# Patient Record
Sex: FEMALE | Race: BLACK OR AFRICAN AMERICAN | NOT HISPANIC OR LATINO | Employment: OTHER | URBAN - METROPOLITAN AREA
[De-identification: names, ages, dates, MRNs, and addresses within clinical notes are randomized per-mention and may not be internally consistent; named-entity substitution may affect disease eponyms.]

---

## 2023-05-28 ENCOUNTER — HOSPITAL ENCOUNTER (EMERGENCY)
Facility: HOSPITAL | Age: 58
Discharge: HOME | End: 2023-05-28
Attending: STUDENT IN AN ORGANIZED HEALTH CARE EDUCATION/TRAINING PROGRAM
Payer: OTHER GOVERNMENT

## 2023-05-28 VITALS
HEART RATE: 76 BPM | OXYGEN SATURATION: 100 % | RESPIRATION RATE: 17 BRPM | TEMPERATURE: 98 F | SYSTOLIC BLOOD PRESSURE: 142 MMHG | DIASTOLIC BLOOD PRESSURE: 97 MMHG

## 2023-05-28 DIAGNOSIS — K57.92 DIVERTICULITIS: ICD-10-CM

## 2023-05-28 DIAGNOSIS — R10.84 GENERALIZED ABDOMINAL PAIN: Primary | ICD-10-CM

## 2023-05-28 PROBLEM — K59.04 CHRONIC IDIOPATHIC CONSTIPATION: Status: ACTIVE | Noted: 2023-05-28

## 2023-05-28 PROBLEM — D50.9 IRON DEFICIENCY ANEMIA: Status: ACTIVE | Noted: 2023-05-28

## 2023-05-28 PROBLEM — I10 ESSENTIAL HYPERTENSION: Status: ACTIVE | Noted: 2023-05-28

## 2023-05-28 PROBLEM — E78.5 HYPERLIPIDEMIA: Status: ACTIVE | Noted: 2023-05-28

## 2023-05-28 PROBLEM — E11.9 DIABETES MELLITUS WITHOUT COMPLICATION: Status: ACTIVE | Noted: 2023-05-28

## 2023-05-28 PROBLEM — F32.A DEPRESSION: Status: ACTIVE | Noted: 2023-05-28

## 2023-05-28 LAB
ALBUMIN SERPL-MCNC: 4.1 G/DL (ref 3.5–5)
ALBUMIN/GLOB SERPL: 1 RATIO (ref 1.1–2)
ALP SERPL-CCNC: 119 UNIT/L (ref 40–150)
ALT SERPL-CCNC: 13 UNIT/L (ref 0–55)
APPEARANCE UR: CLEAR
AST SERPL-CCNC: 19 UNIT/L (ref 5–34)
BACTERIA #/AREA URNS AUTO: NORMAL /HPF
BASOPHILS # BLD AUTO: 0.02 X10(3)/MCL
BASOPHILS NFR BLD AUTO: 0.2 %
BILIRUB UR QL STRIP.AUTO: NEGATIVE MG/DL
BILIRUBIN DIRECT+TOT PNL SERPL-MCNC: 0.6 MG/DL
BUN SERPL-MCNC: 6.9 MG/DL (ref 9.8–20.1)
CALCIUM SERPL-MCNC: 9.8 MG/DL (ref 8.4–10.2)
CHLORIDE SERPL-SCNC: 111 MMOL/L (ref 98–107)
CO2 SERPL-SCNC: 21 MMOL/L (ref 22–29)
COLOR UR: YELLOW
CREAT SERPL-MCNC: 0.89 MG/DL (ref 0.55–1.02)
EOSINOPHIL # BLD AUTO: 0.04 X10(3)/MCL (ref 0–0.9)
EOSINOPHIL NFR BLD AUTO: 0.3 %
ERYTHROCYTE [DISTWIDTH] IN BLOOD BY AUTOMATED COUNT: 13.3 % (ref 11.5–17)
GFR SERPLBLD CREATININE-BSD FMLA CKD-EPI: >60 MLS/MIN/1.73/M2
GLOBULIN SER-MCNC: 4.2 GM/DL (ref 2.4–3.5)
GLUCOSE SERPL-MCNC: 99 MG/DL (ref 74–100)
GLUCOSE UR QL STRIP.AUTO: ABNORMAL MG/DL
HCT VFR BLD AUTO: 47.8 % (ref 37–47)
HGB BLD-MCNC: 15 G/DL (ref 12–16)
IMM GRANULOCYTES # BLD AUTO: 0.02 X10(3)/MCL (ref 0–0.04)
IMM GRANULOCYTES NFR BLD AUTO: 0.2 %
KETONES UR QL STRIP.AUTO: NEGATIVE MG/DL
LEUKOCYTE ESTERASE UR QL STRIP.AUTO: NEGATIVE UNIT/L
LIPASE SERPL-CCNC: 8 U/L
LYMPHOCYTES # BLD AUTO: 2.06 X10(3)/MCL (ref 0.6–4.6)
LYMPHOCYTES NFR BLD AUTO: 17.5 %
MCH RBC QN AUTO: 26.7 PG (ref 27–31)
MCHC RBC AUTO-ENTMCNC: 31.4 G/DL (ref 33–36)
MCV RBC AUTO: 85.1 FL (ref 80–94)
MONOCYTES # BLD AUTO: 0.83 X10(3)/MCL (ref 0.1–1.3)
MONOCYTES NFR BLD AUTO: 7.1 %
NEUTROPHILS # BLD AUTO: 8.77 X10(3)/MCL (ref 2.1–9.2)
NEUTROPHILS NFR BLD AUTO: 74.7 %
NITRITE UR QL STRIP.AUTO: NEGATIVE
NRBC BLD AUTO-RTO: 0 %
PH UR STRIP.AUTO: 6.5 [PH]
PLATELET # BLD AUTO: 419 X10(3)/MCL (ref 130–400)
PMV BLD AUTO: 9.2 FL (ref 7.4–10.4)
POTASSIUM SERPL-SCNC: 3.8 MMOL/L (ref 3.5–5.1)
PROT SERPL-MCNC: 8.3 GM/DL (ref 6.4–8.3)
PROT UR QL STRIP.AUTO: NEGATIVE MG/DL
RBC # BLD AUTO: 5.62 X10(6)/MCL (ref 4.2–5.4)
RBC #/AREA URNS AUTO: <5 /HPF
RBC UR QL AUTO: NEGATIVE UNIT/L
SODIUM SERPL-SCNC: 140 MMOL/L (ref 136–145)
SP GR UR STRIP.AUTO: 1.02 (ref 1–1.03)
SQUAMOUS #/AREA URNS AUTO: <5 /HPF
UROBILINOGEN UR STRIP-ACNC: 0.2 MG/DL
WBC # SPEC AUTO: 11.74 X10(3)/MCL (ref 4.5–11.5)
WBC #/AREA URNS AUTO: <5 /HPF

## 2023-05-28 PROCEDURE — 63600175 PHARM REV CODE 636 W HCPCS: Performed by: NURSE PRACTITIONER

## 2023-05-28 PROCEDURE — 96372 THER/PROPH/DIAG INJ SC/IM: CPT | Mod: 59 | Performed by: PHYSICIAN ASSISTANT

## 2023-05-28 PROCEDURE — 63600175 PHARM REV CODE 636 W HCPCS: Performed by: PHYSICIAN ASSISTANT

## 2023-05-28 PROCEDURE — 99285 EMERGENCY DEPT VISIT HI MDM: CPT | Mod: 25

## 2023-05-28 PROCEDURE — 85025 COMPLETE CBC W/AUTO DIFF WBC: CPT | Performed by: NURSE PRACTITIONER

## 2023-05-28 PROCEDURE — 25000003 PHARM REV CODE 250: Performed by: NURSE PRACTITIONER

## 2023-05-28 PROCEDURE — 96374 THER/PROPH/DIAG INJ IV PUSH: CPT | Mod: 59

## 2023-05-28 PROCEDURE — 96375 TX/PRO/DX INJ NEW DRUG ADDON: CPT

## 2023-05-28 PROCEDURE — 80053 COMPREHEN METABOLIC PANEL: CPT | Performed by: NURSE PRACTITIONER

## 2023-05-28 PROCEDURE — 83690 ASSAY OF LIPASE: CPT | Performed by: NURSE PRACTITIONER

## 2023-05-28 PROCEDURE — 25500020 PHARM REV CODE 255: Performed by: PHYSICIAN ASSISTANT

## 2023-05-28 PROCEDURE — 96361 HYDRATE IV INFUSION ADD-ON: CPT

## 2023-05-28 PROCEDURE — 81001 URINALYSIS AUTO W/SCOPE: CPT | Performed by: NURSE PRACTITIONER

## 2023-05-28 RX ORDER — DICYCLOMINE HYDROCHLORIDE 20 MG/1
20 TABLET ORAL 4 TIMES DAILY
Qty: 28 TABLET | Refills: 0 | Status: SHIPPED | OUTPATIENT
Start: 2023-05-28 | End: 2023-06-04

## 2023-05-28 RX ORDER — CIPROFLOXACIN 500 MG/1
500 TABLET ORAL 2 TIMES DAILY
Qty: 14 TABLET | Refills: 0 | Status: SHIPPED | OUTPATIENT
Start: 2023-05-28 | End: 2023-06-04

## 2023-05-28 RX ORDER — MORPHINE SULFATE 4 MG/ML
4 INJECTION, SOLUTION INTRAMUSCULAR; INTRAVENOUS
Status: COMPLETED | OUTPATIENT
Start: 2023-05-28 | End: 2023-05-28

## 2023-05-28 RX ORDER — PROMETHAZINE HYDROCHLORIDE 25 MG/ML
25 INJECTION, SOLUTION INTRAMUSCULAR; INTRAVENOUS
Status: COMPLETED | OUTPATIENT
Start: 2023-05-28 | End: 2023-05-28

## 2023-05-28 RX ORDER — ONDANSETRON 4 MG/1
4 TABLET, ORALLY DISINTEGRATING ORAL EVERY 6 HOURS PRN
Qty: 20 TABLET | Refills: 0 | Status: SHIPPED | OUTPATIENT
Start: 2023-05-28

## 2023-05-28 RX ORDER — METRONIDAZOLE 500 MG/1
500 TABLET ORAL 2 TIMES DAILY
Qty: 14 TABLET | Refills: 0 | Status: SHIPPED | OUTPATIENT
Start: 2023-05-28 | End: 2023-06-04

## 2023-05-28 RX ORDER — ONDANSETRON 2 MG/ML
4 INJECTION INTRAMUSCULAR; INTRAVENOUS
Status: COMPLETED | OUTPATIENT
Start: 2023-05-28 | End: 2023-05-28

## 2023-05-28 RX ORDER — HYDROCODONE BITARTRATE AND ACETAMINOPHEN 5; 325 MG/1; MG/1
1 TABLET ORAL EVERY 6 HOURS PRN
Qty: 20 TABLET | Refills: 0 | Status: SHIPPED | OUTPATIENT
Start: 2023-05-28 | End: 2023-06-02

## 2023-05-28 RX ORDER — DICYCLOMINE HYDROCHLORIDE 10 MG/ML
20 INJECTION INTRAMUSCULAR
Status: COMPLETED | OUTPATIENT
Start: 2023-05-28 | End: 2023-05-28

## 2023-05-28 RX ORDER — SUCRALFATE 1 G/10ML
1 SUSPENSION ORAL 4 TIMES DAILY
Qty: 414 ML | Refills: 0 | Status: SHIPPED | OUTPATIENT
Start: 2023-05-28

## 2023-05-28 RX ADMIN — PROMETHAZINE HYDROCHLORIDE 25 MG: 25 INJECTION INTRAMUSCULAR; INTRAVENOUS at 01:05

## 2023-05-28 RX ADMIN — ONDANSETRON 4 MG: 2 INJECTION INTRAMUSCULAR; INTRAVENOUS at 11:05

## 2023-05-28 RX ADMIN — SODIUM CHLORIDE, POTASSIUM CHLORIDE, SODIUM LACTATE AND CALCIUM CHLORIDE 1000 ML: 600; 310; 30; 20 INJECTION, SOLUTION INTRAVENOUS at 01:05

## 2023-05-28 RX ADMIN — SODIUM CHLORIDE 1000 ML: 9 INJECTION, SOLUTION INTRAVENOUS at 11:05

## 2023-05-28 RX ADMIN — IOPAMIDOL 100 ML: 755 INJECTION, SOLUTION INTRAVENOUS at 12:05

## 2023-05-28 RX ADMIN — DICYCLOMINE HYDROCHLORIDE 20 MG: 20 INJECTION, SOLUTION INTRAMUSCULAR at 01:05

## 2023-05-28 RX ADMIN — MORPHINE SULFATE 4 MG: 4 INJECTION INTRAVENOUS at 11:05

## 2023-05-28 NOTE — ED PROVIDER NOTES
Encounter Date: 5/28/2023       History     Chief Complaint   Patient presents with    Abdominal Pain     POV with abd pain since yesterday, some nausea. Denies vomiting or diarrhea, no fevers or dysuria. Hx diverticulitis. Recent colonoscopy was negative.      57-year-old female with history of IBS and diverticulitis presents to ED for evaluation of generalized abdominal pain with nausea since yesterday.  Patient denies any vomiting or diarrhea.  Denies any blood in her stool.  Patient reports that she is on Linzess for constipation.  States that she recently had a colonoscopy with GI in [place] which was negative.    The history is provided by the patient. No  was used.   Review of patient's allergies indicates:  No Known Allergies  Past Medical History:   Diagnosis Date    Anemia, unspecified     Chronic constipation     Depression     Diabetes mellitus     GERD (gastroesophageal reflux disease)     Hypertension     IBS (irritable bowel syndrome)     Mixed hyperlipidemia      Past Surgical History:   Procedure Laterality Date    HYSTERECTOMY       History reviewed. No pertinent family history.     Review of Systems   Constitutional:  Negative for chills, fatigue and fever.   Respiratory:  Negative for shortness of breath.    Cardiovascular:  Negative for chest pain.   Gastrointestinal:  Positive for abdominal pain and nausea. Negative for diarrhea and vomiting.   Genitourinary:  Negative for dysuria, flank pain, frequency, pelvic pain and urgency.   Musculoskeletal:  Negative for myalgias.   All other systems reviewed and are negative.    Physical Exam     Initial Vitals [05/28/23 1018]   BP Pulse Resp Temp SpO2   (!) 161/97 98 20 98.1 °F (36.7 °C) 95 %      MAP       --         Physical Exam    Vitals reviewed.  Constitutional: She appears well-developed.   HENT:   Head: Normocephalic and atraumatic.   Right Ear: Tympanic membrane and external ear normal.   Left Ear: Tympanic membrane and  external ear normal.   Mouth/Throat: Uvula is midline, oropharynx is clear and moist and mucous membranes are normal. No trismus in the jaw. No uvula swelling. No oropharyngeal exudate, posterior oropharyngeal edema or posterior oropharyngeal erythema.   Eyes: Conjunctivae are normal. Pupils are equal, round, and reactive to light.   Neck: Neck supple.   Normal range of motion.  Cardiovascular:  Normal rate, regular rhythm and normal heart sounds.           Pulmonary/Chest: Breath sounds normal. She has no wheezes. She has no rhonchi. She has no rales.   Abdominal: Abdomen is soft. Bowel sounds are normal. There is generalized abdominal tenderness. There is no rebound and no guarding.   Musculoskeletal:         General: Normal range of motion.      Cervical back: Normal range of motion and neck supple.     Neurological: She is alert and oriented to person, place, and time.   Skin: Skin is warm and dry.   Psychiatric: She has a normal mood and affect.       ED Course   Procedures  Labs Reviewed   CBC WITH DIFFERENTIAL - Abnormal; Notable for the following components:       Result Value    WBC 11.74 (*)     RBC 5.62 (*)     Hct 47.8 (*)     MCH 26.7 (*)     MCHC 31.4 (*)     Platelet 419 (*)     All other components within normal limits   COMPREHENSIVE METABOLIC PANEL - Abnormal; Notable for the following components:    Chloride 111 (*)     Carbon Dioxide 21 (*)     Blood Urea Nitrogen 6.9 (*)     Globulin 4.2 (*)     Albumin/Globulin Ratio 1.0 (*)     All other components within normal limits   URINALYSIS, REFLEX TO URINE CULTURE - Abnormal; Notable for the following components:    Glucose, UA 2+ (*)     All other components within normal limits   LIPASE - Normal   URINALYSIS, MICROSCOPIC - Normal          Imaging Results              CT Abdomen Pelvis With Contrast (Final result)  Result time 05/28/23 12:29:17      Final result by Ming Enriquez MD (05/28/23 12:29:17)                   Impression:       Diverticulitis along the rectosigmoid junction.  No gross perforation or organized drainable abscess.  There is a small amount of pelvic free fluid.      Electronically signed by: Ming Enriquez  Date:    05/28/2023  Time:    12:29               Narrative:    EXAMINATION:  CT ABDOMEN PELVIS WITH CONTRAST    CLINICAL HISTORY:  Abdominal pain, acute, nonlocalized;    TECHNIQUE:  Helical acquisition through the abdomen and pelvis with IV contrast.  Three plane reconstructions were provided for review. DLP 1957 mGycm. Automatic exposure control, adjustment of mA/kV or iterative reconstruction technique was used to reduce radiation.    COMPARISON:  23 January 2022    FINDINGS:  The limited imaged lung bases are clear.    There are no new suspicious findings solid abdominal organs.    There is no bowel obstruction.  There is colonic diverticulosis with inflammatory changes rectosigmoid junction.  No free air is seen.  No organized drainable fluid collection.    Urinary bladder unremarkable.  Small amount of pelvic free fluid.  Abdominal aorta normal in caliber.    No acute osseous findings.                                       Medications   promethazine injection 25 mg (has no administration in time range)   sodium chloride 0.9% bolus 1,000 mL 1,000 mL (0 mLs Intravenous Stopped 5/28/23 1304)   ondansetron injection 4 mg (4 mg Intravenous Given 5/28/23 1120)   morphine injection 4 mg (4 mg Intravenous Given 5/28/23 1120)   lactated ringers bolus 1,000 mL (0 mLs Intravenous Stopped 5/28/23 1311)   iopamidoL (ISOVUE-370) injection 100 mL (100 mLs Intravenous Given 5/28/23 1207)   dicyclomine injection 20 mg (20 mg Intramuscular Given 5/28/23 1324)     Medical Decision Making:   History:   Old Medical Records: I decided to obtain old medical records.  Old Records Summarized: other records.       <> Summary of Records: Reviewed previous records including history of IBS.  Previous episode of diverticulitis with severe  dehydration.  Initial Assessment:   57-year-old female with history of IBS and diverticulitis presents to ED for evaluation of generalized abdominal pain with nausea since yesterday.  Patient denies any vomiting or diarrhea.  Denies any blood in her stool.  Patient reports that she is on Linzess for constipation.  States that she recently had a colonoscopy with GI in [place] which was negative.  Differential Diagnosis:   Differential Diagnosis includes, but is not limited to:  intussusception, ileus, appendicitis, cholecystitis, cholangitis, diverticulitis, esophagitis, hepatitis, nephrolithiasis, pancreatitis, gastroenteritis, colitis, IBD/IBS, biliary colic, GERD, PUD, constipation, UTI/pyelonephritis,  disorder.      Clinical Tests:   Lab Tests: Ordered and Reviewed  Radiological Study: Ordered and Reviewed  ED Management:  Patient with history of chronic constipation, IBS and diverticulitis presents to ED for evaluation of severe abdominal pain with nausea over the last day.  Patient afebrile and in no acute distress.  Initial tenderness noted on exam improved after IV morphine.  Given Bentyl to help with abdominal cramping.  Mild leukocytosis on lab noted.  CT abdomen and pelvis obtained showing diverticulitis without perforation or abscess.  Patient's pain is under control.  Has not vomited while in the ED.  Discussed admission versus discharge.  Patient agreeable with discharge home on antibiotics with short course of pain medication.  Discussed starting with a clear liquid diet and slowly advancing as tolerated.  Discussed follow-up with GI. Return ED precautions given.  I provided counseling to patient with regard to condition, the treatment plan, specific conditions for return, and the importance of follow up. Detailed written and verbal instructions provided to patient and she expressed a verbal understanding of the discharge instructions and overall management plan. Reiterated the importance of  medication administration and safety. Advised patient to follow up with primary care provider in 3-5 days or sooner if needed.  Answered questions at this time. The patient is stable for discharge.                           Clinical Impression:   Final diagnoses:  [R10.84] Generalized abdominal pain (Primary)  [K57.92] Diverticulitis        ED Disposition Condition    Discharge Stable          ED Prescriptions       Medication Sig Dispense Start Date End Date Auth. Provider    sucralfate (CARAFATE) 100 mg/mL suspension Take 10 mLs (1 g total) by mouth 4 (four) times daily. 414 mL 5/28/2023 -- WANG Mckenna    metroNIDAZOLE (FLAGYL) 500 MG tablet Take 1 tablet (500 mg total) by mouth 2 (two) times a day. for 7 days 14 tablet 5/28/2023 6/4/2023 WANG Mckenna    ciprofloxacin HCl (CIPRO) 500 MG tablet Take 1 tablet (500 mg total) by mouth 2 (two) times daily. for 7 days 14 tablet 5/28/2023 6/4/2023 WANG Mckenna    ondansetron (ZOFRAN-ODT) 4 MG TbDL Take 1 tablet (4 mg total) by mouth every 6 (six) hours as needed. 20 tablet 5/28/2023 -- WANG Mckenna    HYDROcodone-acetaminophen (NORCO) 5-325 mg per tablet Take 1 tablet by mouth every 6 (six) hours as needed for Pain. 20 tablet 5/28/2023 6/2/2023 WANG Mckenna    dicyclomine (BENTYL) 20 mg tablet Take 1 tablet (20 mg total) by mouth 4 (four) times daily. for 7 days 28 tablet 5/28/2023 6/4/2023 WANG Mckenna          Follow-up Information       Follow up With Specialties Details Why Contact Info    [facility]    [id number] [place] [id number] [phone]               WANG Mckenna  05/28/23 7866

## 2023-05-28 NOTE — DISCHARGE INSTRUCTIONS
Make sure drinking plenty of fluids.  Start with a clear liquid diet advancing after 3 days to a bland diet.  Take full course of antibiotics.  May use Bentyl for abdominal cramping.  Use Carafate coat the inside of your stomach.  May use Norco for severe pain.  Do not drink or drive while taking narcotics this can be sedating.  May use stool softener as can make you constipated.

## 2023-05-28 NOTE — FIRST PROVIDER EVALUATION
Medical screening examination initiated.  I have conducted a focused provider triage encounter, findings are as follows:    Brief history of present illness:  56 y/o female who presents with generalized abdominal pain since yesterday with nausea. No vomiting or diarrhea. No fever. No urinary complaints. Hx diverticulitis.     There were no vitals filed for this visit.    Pertinent physical exam:  alert, nonlabored, ambulatory     Brief workup plan:  labs, urine    Preliminary workup initiated; this workup will be continued and followed by the physician or advanced practice provider that is assigned to the patient when roomed.

## 2023-07-24 ENCOUNTER — PATIENT MESSAGE (OUTPATIENT)
Dept: RESEARCH | Facility: HOSPITAL | Age: 58
End: 2023-07-24
Payer: OTHER GOVERNMENT

## 2023-10-31 DIAGNOSIS — J40 BRONCHITIS: Primary | ICD-10-CM

## 2023-10-31 DIAGNOSIS — J01.90 SINUSITIS, ACUTE: ICD-10-CM

## 2023-12-11 ENCOUNTER — PROCEDURE VISIT (OUTPATIENT)
Dept: RESPIRATORY THERAPY | Facility: HOSPITAL | Age: 58
End: 2023-12-11
Attending: PHYSICAL MEDICINE & REHABILITATION
Payer: OTHER GOVERNMENT

## 2023-12-11 VITALS — OXYGEN SATURATION: 97 % | HEART RATE: 75 BPM

## 2023-12-11 DIAGNOSIS — J01.90 SINUSITIS, ACUTE: ICD-10-CM

## 2023-12-11 DIAGNOSIS — J40 BRONCHITIS: ICD-10-CM

## 2023-12-11 PROCEDURE — 25000242 PHARM REV CODE 250 ALT 637 W/ HCPCS: Performed by: INTERNAL MEDICINE

## 2023-12-11 PROCEDURE — 94060 EVALUATION OF WHEEZING: CPT

## 2023-12-11 PROCEDURE — 94729 DIFFUSING CAPACITY: CPT

## 2023-12-11 PROCEDURE — 94727 GAS DIL/WSHOT DETER LNG VOL: CPT

## 2023-12-11 PROCEDURE — 94760 N-INVAS EAR/PLS OXIMETRY 1: CPT

## 2023-12-11 PROCEDURE — 99900035 HC TECH TIME PER 15 MIN (STAT)

## 2023-12-11 RX ORDER — ALBUTEROL SULFATE 0.83 MG/ML
2.5 SOLUTION RESPIRATORY (INHALATION) ONCE
Status: COMPLETED | OUTPATIENT
Start: 2023-12-11 | End: 2023-12-11

## 2023-12-11 RX ADMIN — ALBUTEROL SULFATE 2.5 MG: 2.5 SOLUTION RESPIRATORY (INHALATION) at 07:12

## 2023-12-11 NOTE — PROGRESS NOTES
PFT performed. 2.5mg Albuterol given. No adverse reactions noted. BBS clear. Resting HR 75bpm. SpO2 97% on RA.

## 2023-12-18 LAB
DLCO ADJ PRE: 20.3 ML/(MIN*MMHG) (ref 16.24–27.71)
DLCO SINGLE BREATH LLN: 16.24
DLCO SINGLE BREATH PRE REF: 92.4 %
DLCO SINGLE BREATH REF: 21.97
DLCOC SBVA LLN: 3.18
DLCOC SBVA PRE REF: 118 %
DLCOC SBVA REF: 4.81
DLCOC SINGLE BREATH LLN: 16.24
DLCOC SINGLE BREATH PRE REF: 92.4 %
DLCOC SINGLE BREATH REF: 21.97
DLCOVA LLN: 3.18
DLCOVA PRE REF: 118 %
DLCOVA PRE: 5.67 ML/(MIN*MMHG*L) (ref 3.18–6.44)
DLCOVA REF: 4.81
DLVAADJ PRE: 5.67 ML/(MIN*MMHG*L) (ref 3.18–6.44)
ERV LLN: -16449.16
ERV PRE REF: 107.9 %
ERV REF: 0.84
FEF 25 75 CHG: 16.6 %
FEF 25 75 LLN: 0.93
FEF 25 75 POST REF: 192 %
FEF 25 75 PRE REF: 164.6 %
FEF 25 75 REF: 2.05
FET100 CHG: 1.9 %
FEV1 CHG: 3.1 %
FEV1 FVC CHG: -0.1 %
FEV1 FVC LLN: 69
FEV1 FVC POST REF: 112 %
FEV1 FVC PRE REF: 112.1 %
FEV1 FVC REF: 81
FEV1 LLN: 1.54
FEV1 POST REF: 114.7 %
FEV1 PRE REF: 111.2 %
FEV1 REF: 2.08
FRCPLETH LLN: 1.75
FRCPLETH PREREF: 82.9 %
FRCPLETH REF: 2.57
FVC CHG: 3.3 %
FVC LLN: 1.95
FVC POST REF: 102.1 %
FVC PRE REF: 98.8 %
FVC REF: 2.6
IVC PRE: 2.38 L (ref 1.95–3.25)
IVC SINGLE BREATH LLN: 1.95
IVC SINGLE BREATH PRE REF: 91.5 %
IVC SINGLE BREATH REF: 2.6
MVV LLN: 75
MVV PRE REF: 94.3 %
MVV REF: 88
PEF CHG: 10.5 %
PEF LLN: 3.64
PEF POST REF: 107 %
PEF PRE REF: 96.8 %
PEF REF: 5.51
POST FEF 25 75: 3.94 L/S (ref 0.93–3.18)
POST FET 100: 7.12 SEC
POST FEV1 FVC: 90.19 % (ref 69.18–91.86)
POST FEV1: 2.39 L (ref 1.54–2.63)
POST FVC: 2.65 L (ref 1.95–3.25)
POST PEF: 5.89 L/S (ref 3.64–7.38)
PRE DLCO: 20.3 ML/(MIN*MMHG) (ref 16.24–27.71)
PRE ERV: 0.9 L (ref -16449.16–16450.84)
PRE FEF 25 75: 3.38 L/S (ref 0.93–3.18)
PRE FET 100: 6.98 SEC
PRE FEV1 FVC: 90.3 % (ref 69.18–91.86)
PRE FEV1: 2.32 L (ref 1.54–2.63)
PRE FRC PL: 2.13 L
PRE FVC: 2.57 L (ref 1.95–3.25)
PRE MVV: 83 L/MIN (ref 74.85–101.27)
PRE PEF: 5.33 L/S (ref 3.64–7.38)
PRE RV: 1.17 L (ref 1.16–2.31)
PRE TLC: 4.05 L (ref 3.58–5.56)
RAW LLN: 3.06
RAW PRE REF: 66.7 %
RAW PRE: 2.04 CMH2O*S/L (ref 3.06–3.06)
RAW REF: 3.06
RV LLN: 1.16
RV PRE REF: 67.2 %
RV REF: 1.74
RVTLC LLN: 28
RVTLC PRE REF: 76 %
RVTLC PRE: 28.87 % (ref 28.41–47.59)
RVTLC REF: 38
TLC LLN: 3.58
TLC PRE REF: 88.5 %
TLC REF: 4.57
VA PRE: 3.57 L (ref 4.42–4.42)
VA SINGLE BREATH LLN: 4.42
VA SINGLE BREATH PRE REF: 80.8 %
VA SINGLE BREATH REF: 4.42
VC LLN: 1.95
VC PRE REF: 110.8 %
VC PRE: 2.88 L (ref 1.95–3.25)
VC REF: 2.6
VTGRAWPRE: 2.51 L

## 2024-10-01 ENCOUNTER — DOCUMENT SCAN (OUTPATIENT)
Facility: CLINIC | Age: 59
End: 2024-10-01
Payer: OTHER GOVERNMENT

## 2024-10-16 ENCOUNTER — OFFICE VISIT (OUTPATIENT)
Facility: CLINIC | Age: 59
End: 2024-10-16
Payer: OTHER GOVERNMENT

## 2024-10-16 VITALS
DIASTOLIC BLOOD PRESSURE: 89 MMHG | WEIGHT: 185 LBS | BODY MASS INDEX: 34.04 KG/M2 | SYSTOLIC BLOOD PRESSURE: 135 MMHG | HEIGHT: 62 IN | HEART RATE: 78 BPM

## 2024-10-16 DIAGNOSIS — M48.02 CERVICAL SPINAL STENOSIS: ICD-10-CM

## 2024-10-16 DIAGNOSIS — M79.2 NEURALGIA: ICD-10-CM

## 2024-10-16 DIAGNOSIS — M48.02 FORAMINAL STENOSIS OF CERVICAL REGION: ICD-10-CM

## 2024-10-16 DIAGNOSIS — M54.2 CERVICALGIA: Primary | ICD-10-CM

## 2024-10-16 PROCEDURE — 99204 OFFICE O/P NEW MOD 45 MIN: CPT | Mod: ,,, | Performed by: NURSE PRACTITIONER

## 2024-10-16 RX ORDER — SIMETHICONE 80 MG
80 TABLET,CHEWABLE ORAL
COMMUNITY
Start: 2024-07-16

## 2024-10-16 RX ORDER — BUPROPION HYDROCHLORIDE 150 MG/1
TABLET ORAL
COMMUNITY
Start: 2023-10-27 | End: 2025-04-28

## 2024-10-16 RX ORDER — BENAZEPRIL HYDROCHLORIDE 40 MG/1
TABLET ORAL
COMMUNITY
Start: 2023-12-15 | End: 2025-03-19

## 2024-10-16 RX ORDER — LANOLIN ALCOHOL/MO/W.PET/CERES
1000 CREAM (GRAM) TOPICAL
COMMUNITY
Start: 2024-03-24

## 2024-10-16 RX ORDER — POTASSIUM CHLORIDE 20 MEQ/1
TABLET, EXTENDED RELEASE ORAL
COMMUNITY
Start: 2024-03-18 | End: 2025-03-19

## 2024-10-16 RX ORDER — TOPIRAMATE 100 MG/1
TABLET, FILM COATED ORAL
COMMUNITY
Start: 2024-04-27 | End: 2025-04-28

## 2024-10-16 RX ORDER — AMLODIPINE BESYLATE 10 MG/1
1 TABLET ORAL DAILY
COMMUNITY
Start: 2023-12-15 | End: 2025-03-25

## 2024-10-16 RX ORDER — MELATONIN 5 MG
5 CAPSULE ORAL
COMMUNITY
Start: 2023-10-27

## 2024-10-16 RX ORDER — ACETAMINOPHEN 325 MG/1
TABLET ORAL
COMMUNITY
Start: 2024-03-18 | End: 2025-03-19

## 2024-10-16 RX ORDER — CETIRIZINE HYDROCHLORIDE 10 MG/1
10 TABLET ORAL
COMMUNITY
Start: 2024-03-18

## 2024-10-16 RX ORDER — CYCLOBENZAPRINE HCL 10 MG
10 TABLET ORAL 3 TIMES DAILY PRN
COMMUNITY

## 2024-10-16 RX ORDER — CLOTRIMAZOLE 1 G/ML
SOLUTION TOPICAL
COMMUNITY
Start: 2024-03-18 | End: 2025-03-19

## 2024-10-16 RX ORDER — WITCH HAZEL 50 %
PADS, MEDICATED (EA) TOPICAL DAILY
COMMUNITY

## 2024-10-16 RX ORDER — GABAPENTIN 100 MG/1
100 CAPSULE ORAL 3 TIMES DAILY
Qty: 90 CAPSULE | Refills: 11 | Status: SHIPPED | OUTPATIENT
Start: 2024-10-16 | End: 2025-10-16

## 2024-10-16 RX ORDER — MELOXICAM 15 MG/1
15 TABLET ORAL DAILY
COMMUNITY

## 2024-10-16 RX ORDER — AMMONIUM LACTATE 12 G/100G
LOTION TOPICAL
COMMUNITY
Start: 2024-03-18

## 2024-10-16 RX ORDER — TRIAMCINOLONE ACETONIDE 1 MG/G
OINTMENT TOPICAL
COMMUNITY
Start: 2024-07-16

## 2024-10-16 RX ORDER — PRAZOSIN HYDROCHLORIDE 2 MG/1
2 CAPSULE ORAL
COMMUNITY
Start: 2024-04-27

## 2024-10-16 RX ORDER — MULTIVITAMIN
1 TABLET ORAL EVERY MORNING
COMMUNITY

## 2024-10-16 RX ORDER — ERGOCALCIFEROL 1.25 MG/1
CAPSULE ORAL
COMMUNITY
Start: 2024-07-16

## 2024-10-16 NOTE — PROGRESS NOTES
[facility]    Subjective:     Chief Complaint: Cervical Spine Pain (C-spine) ([facility], cervicalgia w/ neuralgia, would benefit from LYLA C3-4, C6-7, C7-T1, mri in referral in  C/O pain level 6, Taking pain meds, no prior injury or sx, pain started 30 years has gotten progressively worse the last few years. Pt has gone to PT in the past is currently going to  [facility] is doing massage therapy only. )    Referred by: Morgan Cortez He*     History of Present Illness: Evelyn Gonzalez is a 59 y.o. female  presents today as a new consult for cervicalgia with neuralgia and request of treating with cervical epidural steroid injection C3-for C6-7 C7-T1.   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  Primary pain is located to her posterior neck, and bilateral shoulders without radiating down her arms.  She denies myelopathy symptoms (dropping items in her hands, urinary retention, fecal incontinence, spasticity and saddle anesthesia).  Her pain will elevate to a 5/10 looking up and driving as well as trying to sleep.  This causes insomnia.  She describes her pain as sharp.  These activities will elevate her pain score to a 5/10 on the NRS.  She also uses a back brace throughout the entire day without taking a break.  Additionally she has completed physical therapy for the past 3 months that his helps in the moment however it does not have sustainable relief.  She also has tried chiropractic visits and massages for several weeks with temporary pain relief and she is contemplating cervical traction.  At times when her pain is intense she requires a single prong cane to walk.  Her pain will reduced to 1/10 on the NRS if she uses a support for her back and alternate with heat rubbing her back Salonpas 10s machine and massages.  She has pertinent findings in the cervical MRI that show  "cervical facet arthropathy as well as severe central canal stenosis at C6-C7 and severe neural foraminal narrowing at C6 C7-T1.  She also has facet arthropathy throughout multiple levels in her cervical spine.    Pain medications include Tylenol 325 mg every 8 hours as needed, lidocaine 5% ointment applied to skin daily and and as needed for pain, she has never tried any gabapentin in his interested in doing so.  She has also tried 10s units that gave her temporary relief of pain.    Vital signs:   Visit Vitals  /89 (BP Location: Right arm, Patient Position: Sitting)   Pulse 78   Ht 5' 2" (1.575 m)   Wt 83.9 kg (185 lb)   BMI 33.84 kg/m²      Vitals:    10/16/24 0957   PainSc:   6     Pain Disability Index (PDI): 48       Interventional Pain History  None    ROS: Neck pain     Pertinent labs 2024:   Creatinine normal, hemoglobin A1c 5.5, vitamin B12 high 1313, normal vitamin-D  normal AST/ALT    Cervical MRI 2024:   Findings: Visualized lungs are clear.  Cervical soft tissues unremarkable.  Visualized cranial structures normal.      There was no acute fracture or dislocation.  There is multilevel cervical spondylosis and facet disease.  The spinous processes are intact.  The odontoid process is intact.  The prevertebral soft tissues are normal.      C2-3: No evidence of neural impingement.      C3-4: Disc osteophyte complex with facet disease symmetric to left contributes to severe left neural foraminal narrowing and mild right.    C4-5:  Disc osteophyte complex with facet disease contributes to mild-to-moderate left neural foraminal narrowing.      C5-6: Asymmetric left-sided disc osteophyte complex with facet disease contributes to mild-to-moderate left neural foraminal narrowing.      C6-7: Disc osteophyte complex with facet disease asymmetric to the left contributes to severe left neural foraminal narrowing.  There is mild right neural foraminal stenosis.  Severe central canal stenosis.    C7-T1: Disc " osteophyte complex with facet disease contributes to severe left neural foraminal narrowing.  No definite central canal narrowing.      Impression:   A level cervical spondylosis and facet disease most significant at C6-7.  Please see above level by level interpretation for details    Objective:        Physical Exam  General: Well developed; overweight; A&O x 3; No anxiety/depression; NAD  Mental Status: Oriented to person, palce and time. Displays appropriate mood & affect.  Head: Norm cephalic and atraumatic  Neck:  Bilateral cervical paraspinal banding.  Limited and painful range of motion with lateral turning and cervical flexion +extension.  Equal handgrip bilaterally.  Able to fully extend arms above head without pain.  Eyes: normal conjunctiva, normal lids, normal pupils  ENT and mouth: normal external ear, nose, and no lesions noted on the lips.  Respiratory: Symmetrical, Unlabored. No dyspnea  CV: normal rhythm and rate. No peripheral edema.   Abdomen: Non-distended    Extremities:  Gen: No cyanosis or tenderness to palpation bilateral upper and lower extremities  Skin: Warm, pink, dry, no rashes, no lesions on the lumbar spine  Strength: 5/5 motor strength bilateral upper and lower extremities  ROM: Full ROM in bilateral knees and ankles without pain or instability.    Neuro:  Gait: no altalgic lean, normal toe and heel raise. Independent ambulator.  DTR's: 2+ in bilateral patellar,  Sensory: Intact to light touch bilateral  upper and lower extremities    Spine: Normal lordosis. No scoliosis  L-spine ROM:  Limited and painful ROM to flexion, extension, bilateral rotation,   Straight Leg Raise:  Negative bilaterally  SI Joint: No tenderness to palpation bilaterally.      Assessment:     Evelyn Gonzalez is a 59 y.o. female  presents today as a new consult for cervicalgia with neuralgia and request of treating with cervical epidural steroid injection C3-for C6-7 C7-T1.  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  Primary pain is located to her posterior neck, and bilateral shoulders without radiating down her arms.  She denies myelopathy symptoms (dropping items in her hands, urinary retention, fecal incontinence, spasticity and saddle anesthesia).  Her pain will elevate to a 5/10 looking up and driving as well as trying to sleep.  This causes insomnia.  She describes her pain as sharp.  These activities will elevate her pain score to a 5/10 on the NRS.  She also uses a back brace throughout the entire day without taking a break.  Additionally she has completed physical therapy for the past 3 months that his helps in the moment however it does not have sustainable relief.  She also has tried chiropractic visits and massages for several weeks with temporary pain relief and she is contemplating cervical traction.  At times when her pain is intense she requires a single prong cane to walk.  Her pain will reduced to 1/10 on the NRS if she uses a support for her back and alternate with heat rubbing her back Salonpas 10s machine and massages.  She has pertinent findings in the cervical MRI that show cervical facet arthropathy as well as severe central canal stenosis at C6-C7 and severe neural foraminal narrowing at C6 C7-T1.  She also has facet arthropathy throughout multiple levels in her cervical spine.    Pain medications include Tylenol 325 mg every 8 hours as needed, lidocaine 5% ointment applied to skin daily and and as needed for pain, she has never tried any gabapentin in his interested in doing so.  She has also tried 10s units that gave her temporary relief of pain.    Plan of Care:   Teaching sheet epidural steroid given to patient  Patient to think about a cervical LULI C7-T1 and call back.  Gabapentin 100 mg TID Rx today  [facility] will also reach out to her physician evaluating her lumbar spine pain to send us a  a new consult to treat this.    Encounter Diagnoses   Name Primary?    Cervicalgia Yes    Neuralgia          Plan:       Evelyn was seen today for cervical spine pain (c-spine).    Diagnoses and all orders for this visit:    Cervicalgia    Neuralgia           Past Medical History:   Diagnosis Date    Anemia, unspecified     Chronic constipation     Depression     Diabetes mellitus     GERD (gastroesophageal reflux disease)     Hypertension     IBS (irritable bowel syndrome)     Mixed hyperlipidemia        Past Surgical History:   Procedure Laterality Date    HYSTERECTOMY         No family history on file.    Social History     Socioeconomic History    Marital status:    Tobacco Use    Smoking status: Never    Smokeless tobacco: Never   Substance and Sexual Activity    Alcohol use: Yes    Drug use: Never       Current Outpatient Medications   Medication Sig Dispense Refill    acetaminophen (TYLENOL) 325 MG tablet TAKE TWO TABLETS BY MOUTH EVERY 8 HOURS AS NEEDED FOR PAIN/FEVER      amLODIPine (NORVASC) 10 MG tablet Take 1 tablet by mouth once daily.      ammonium lactate (LAC-HYDRIN) 12 % lotion Apply topically.      benazepriL (LOTENSIN) 40 MG tablet TAKE ONE TABLET BY MOUTH EVERY DAY FOR HIGH BLOOD PRESSURE FOR BLOOD PRESSURE , KIDNEYS AND HEART FOR KIDNEY PROTECTION IN DIABETES      buPROPion (WELLBUTRIN XL) 150 MG TB24 tablet TAKE ONE TABLET BY MOUTH EVERY MORNING FOR DEPRESSION      cetirizine (ZYRTEC) 10 MG tablet Take 10 mg by mouth.      clotrimazole (LOTRIMIN) 1 % Soln APPLY SMALL AMOUNT TO THE SKIN TWICE A DAY FOR FUNGAL INFECTION      cyanocobalamin (VITAMIN B-12) 1000 MCG tablet Take 1,000 mcg by mouth.      cyclobenzaprine (FLEXERIL) 10 MG tablet Take 10 mg by mouth 3 (three) times daily as needed for Muscle spasms.      empagliflozin (JARDIANCE) 25 mg tablet Take 1 tablet by mouth once daily.      ergocalciferol (ERGOCALCIFEROL) 50,000 unit Cap Take by mouth.      Lactobacillus acidophilus 1  billion cell Tab Take by mouth once daily.      melatonin 5 mg Cap 5 mg.      meloxicam (MOBIC) 15 MG tablet Take 15 mg by mouth once daily.      multivitamin with folic acid 400 mcg Tab Take 1 tablet by mouth every morning.      ondansetron (ZOFRAN-ODT) 4 MG TbDL Take 1 tablet (4 mg total) by mouth every 6 (six) hours as needed. 20 tablet 0    potassium chloride SA (K-DUR,KLOR-CON) 20 MEQ tablet TAKE ONE TABLET BY MOUTH SATURDAY AND SUNDAY FOR LOW POTASSIUM LEVEL      prazosin (MINIPRESS) 2 MG Cap Take 2 mg by mouth.      semaglutide (OZEMPIC) 1 mg/dose (4 mg/3 mL) Inject 1 mg into the skin every 7 days.      simethicone (MYLICON) 80 MG chewable tablet Take 80 mg by mouth.      sucralfate (CARAFATE) 100 mg/mL suspension Take 10 mLs (1 g total) by mouth 4 (four) times daily. 414 mL 0    topiramate (TOPAMAX) 100 MG tablet TAKE ONE AND ONE-HALF TABLETS BY MOUTH TWICE A DAY FOR MOOD STABILIZATION      triamcinolone acetonide 0.1% (KENALOG) 0.1 % ointment Apply topically.       No current facility-administered medications for this visit.       Review of patient's allergies indicates:   Allergen Reactions    Doxycycline Itching and Swelling     Other Reaction(s): Itching of eye, Lip swelling, Itching of eye, Lip swelling    Esomeprazole magnesium Itching    Latex Rash    Naproxen Nausea Only     Causes stomach ulcers    Zaleplon Rash

## 2025-08-13 DIAGNOSIS — M25.561 PAIN IN RIGHT KNEE: Primary | ICD-10-CM
